# Patient Record
Sex: FEMALE | Race: BLACK OR AFRICAN AMERICAN | NOT HISPANIC OR LATINO | Employment: FULL TIME | URBAN - METROPOLITAN AREA
[De-identification: names, ages, dates, MRNs, and addresses within clinical notes are randomized per-mention and may not be internally consistent; named-entity substitution may affect disease eponyms.]

---

## 2018-05-24 LAB
CHOLEST SERPL-MSCNC: 244 MG/DL (ref 0–200)
HBA1C MFR BLD: 5.8 % (ref 4.2–5.7)
HDLC SERPL-MCNC: 72 MG/DL (ref 35–70)
LDL/HDL RATIO: 3 (ref 1–3)
LDLC SERPL CALC-MCNC: 159 MG/DL
TRIGL SERPL-MCNC: 69 MG/DL (ref 0–130)
VLDL CHOLESTEROL: 13 MG/DL (ref 0–40)

## 2019-09-17 ENCOUNTER — TELEPHONE (OUTPATIENT)
Dept: FAMILY MEDICINE | Facility: CLINIC | Age: 64
End: 2019-09-17

## 2019-09-17 DIAGNOSIS — Z12.39 BREAST CANCER SCREENING: Primary | ICD-10-CM

## 2019-09-17 NOTE — TELEPHONE ENCOUNTER
----- Message from Leah Irvin sent at 9/17/2019  2:57 PM CDT -----  PT NEED AN ORDER FOR HER MAMMO SENT TO  [facility].

## 2019-10-22 ENCOUNTER — TELEPHONE (OUTPATIENT)
Dept: UROLOGY | Facility: CLINIC | Age: 64
End: 2019-10-22

## 2019-10-22 NOTE — TELEPHONE ENCOUNTER
----- Message from Emmanuel Dominguez sent at 10/21/2019  1:11 PM CDT -----  Contact: self  Type:  RX Refill Request    Who Called: pt  Refill or New Rx:refill  RX Name and Strength:oxybutynin-5mg  How is the patient currently taking it? (ex. 1XDay):1xday  Is this a 30 day or 90 day RX:30  Preferred Pharmacy with phone number:   [facility]  [address], [place]  Local or Mail Order:local  Ordering Provider:marge  Would the patient rather a call back or a response via [facility]? Call back  Best Call Back Number:[phone]  Additional Information: none    Thanks,  Emmanuel Dominguez

## 2019-11-08 ENCOUNTER — TELEPHONE (OUTPATIENT)
Dept: UROLOGY | Facility: CLINIC | Age: 64
End: 2019-11-08

## 2019-11-08 NOTE — TELEPHONE ENCOUNTER
----- Message from Soha Guillory sent at 11/8/2019  8:45 AM CST -----  Contact: Patient   .Type:  Needs Medical Advice    Who Called: Patient   Symptoms (please be specific): hernia issues   How long has patient had these symptoms:    [facility]  Would the patient rather a call back or a response via [facility]? call  Best Call Back Number: [phone]  Additional Information: Patient thinks the medication she is on now is messing with her hernia and would like the  To change her medication .

## 2019-12-05 ENCOUNTER — OFFICE VISIT (OUTPATIENT)
Dept: FAMILY MEDICINE | Facility: CLINIC | Age: 64
End: 2019-12-05

## 2019-12-05 VITALS
TEMPERATURE: 97 F | BODY MASS INDEX: 27.82 KG/M2 | OXYGEN SATURATION: 100 % | HEIGHT: 65 IN | DIASTOLIC BLOOD PRESSURE: 73 MMHG | SYSTOLIC BLOOD PRESSURE: 134 MMHG | HEART RATE: 62 BPM | WEIGHT: 167 LBS

## 2019-12-05 DIAGNOSIS — I10 ESSENTIAL HYPERTENSION: ICD-10-CM

## 2019-12-05 DIAGNOSIS — F41.9 ANXIETY: ICD-10-CM

## 2019-12-05 PROCEDURE — 99396 PREV VISIT EST AGE 40-64: CPT | Mod: S$GLB,,, | Performed by: FAMILY MEDICINE

## 2019-12-05 PROCEDURE — 99396 PR PREVENTIVE VISIT,EST,40-64: ICD-10-PCS | Mod: S$GLB,,, | Performed by: FAMILY MEDICINE

## 2019-12-05 RX ORDER — DIAZEPAM 5 MG/1
TABLET ORAL
COMMUNITY
End: 2019-12-05 | Stop reason: SDUPTHER

## 2019-12-05 RX ORDER — CALC/MAG/B COMPLEX/D3/HERB 61
15 TABLET ORAL DAILY
COMMUNITY
End: 2023-10-26

## 2019-12-05 RX ORDER — HYDROCHLOROTHIAZIDE 12.5 MG/1
12.5 CAPSULE ORAL DAILY
Refills: 5 | COMMUNITY
Start: 2019-10-06 | End: 2020-01-13 | Stop reason: SDUPTHER

## 2019-12-05 RX ORDER — ALBUTEROL SULFATE 90 UG/1
AEROSOL, METERED RESPIRATORY (INHALATION)
COMMUNITY

## 2019-12-05 RX ORDER — OXYBUTYNIN CHLORIDE 5 MG/1
5 TABLET, EXTENDED RELEASE ORAL DAILY
Refills: 5 | COMMUNITY
Start: 2019-11-17 | End: 2020-04-09

## 2019-12-05 RX ORDER — ESCITALOPRAM OXALATE 10 MG/1
10 TABLET ORAL DAILY
Refills: 3 | COMMUNITY
Start: 2019-10-14 | End: 2020-01-13 | Stop reason: SDUPTHER

## 2019-12-05 RX ORDER — ASPIRIN 81 MG/1
81 TABLET ORAL DAILY
COMMUNITY

## 2019-12-05 RX ORDER — DIAZEPAM 5 MG/1
5 TABLET ORAL EVERY 6 HOURS PRN
Qty: 30 TABLET | Refills: 0 | Status: SHIPPED | OUTPATIENT
Start: 2019-12-05 | End: 2020-02-27 | Stop reason: SDUPTHER

## 2019-12-06 NOTE — PROGRESS NOTES
SUBJECTIVE:   64 y.o. female for annual routine checkup.  Patient reports that she has had her mammogram done.  She has weaned herself off of Lexapro but still takes the diazepam as needed for anxiety.  She would like to get a refill of the diazepam.  She is also still taking the hydrochlorothiazide for her blood pressure and Prevacid for acid reflux.  She was started on oxybutynin for urge incontinence but she states the medicine causes her hiatal hernia to flare-up so she stopped taking it.  She has not been following her diet as she was previously the she still has been walking.  She does not smoke or drink alcohol.  Current Outpatient Medications   Medication Sig Dispense Refill    albuterol (PROVENTIL/VENTOLIN HFA) 90 mcg/actuation inhaler ProAir HFA 90 mcg/actuation aerosol inhaler   TAKE 2 PUFFS BY MOUTH 4 TIMES A DAY AS NEEDED FOR SHORTNESS OF BREATH/WHEEZING/COUGH      aspirin (ECOTRIN) 81 MG EC tablet Take 81 mg by mouth once daily.      diazePAM (VALIUM) 5 MG tablet Take 1 tablet (5 mg total) by mouth every 6 (six) hours as needed for Anxiety. 30 tablet 0    escitalopram oxalate (LEXAPRO) 10 MG tablet Take 10 mg by mouth once daily.  3    hydroCHLOROthiazide (MICROZIDE) 12.5 mg capsule Take 12.5 mg by mouth once daily.  5    lansoprazole (PREVACID) 15 MG capsule Take 15 mg by mouth once daily.      multivitamin capsule Take 1 capsule by mouth once daily.      oxybutynin (DITROPAN-XL) 5 MG TR24 Take 5 mg by mouth once daily.  5     No current facility-administered medications for this visit.      Allergies: Latex   No LMP recorded. Patient has had a hysterectomy.    ROS:  Feeling well. No dyspnea or chest pain on exertion.  No abdominal pain, change in bowel habits, black or bloody stools.  No urinary tract symptoms. GYN ROS: no breast pain or new or enlarging lumps on self exam. No neurological complaints; anxiety has been being controlled with intermittent diazepam.    OBJECTIVE:   The patient  "appears well, alert, oriented x 3, in no distress.  /73 (BP Location: Left arm, Patient Position: Sitting, BP Method: Medium (Automatic))   Pulse 62   Temp 97.4 °F (36.3 °C)   Ht 5' 5" (1.651 m)   Wt 75.8 kg (167 lb)   SpO2 100%   BMI 27.79 kg/m²   ENT normal.  Neck supple. No adenopathy or thyromegaly. VELMA. Lungs are clear, good air entry, no wheezes, rhonchi or rales. S1 and S2 normal, no murmurs, regular rate and rhythm. Abdomen soft without tenderness, guarding, mass or organomegaly. Extremities show no edema, normal peripheral pulses. Neurological is normal, no focal findings.      ASSESSMENT:   Adult health examination  Essential hypertension  Gastroesophageal reflux disease  Generalized anxiety disorder    PLAN:   Hypertension is chronic and has been controlled on hydrochlorothiazide 12.5 mg.  GERD is chronic and has been controlled on Prevacid 50 mg daily.  Anxiety is much better controlled and she has weaned off of Lexapro and takes diazepam only as needed and is currently in need of a refill.  She is encouraged to continue her healthy eating and exercising regularly.  "

## 2020-01-13 DIAGNOSIS — F41.9 ANXIETY: Primary | ICD-10-CM

## 2020-01-13 DIAGNOSIS — I10 ESSENTIAL HYPERTENSION: ICD-10-CM

## 2020-01-13 RX ORDER — ESCITALOPRAM OXALATE 10 MG/1
10 TABLET ORAL DAILY
Qty: 90 TABLET | Refills: 3 | Status: SHIPPED | OUTPATIENT
Start: 2020-01-13

## 2020-01-13 RX ORDER — HYDROCHLOROTHIAZIDE 12.5 MG/1
12.5 CAPSULE ORAL DAILY
Qty: 90 CAPSULE | Refills: 3 | Status: SHIPPED | OUTPATIENT
Start: 2020-01-13 | End: 2023-10-26

## 2020-01-15 ENCOUNTER — CLINICAL SUPPORT (OUTPATIENT)
Dept: FAMILY MEDICINE | Facility: CLINIC | Age: 65
End: 2020-01-15

## 2020-01-15 PROCEDURE — 90471 IMMUNIZATION ADMIN: CPT | Mod: S$GLB,,, | Performed by: FAMILY MEDICINE

## 2020-01-15 PROCEDURE — 90686 FLU VACCINE (QUAD) GREATER THAN OR EQUAL TO 3YO PRESERVATIVE FREE IM: ICD-10-PCS | Mod: S$GLB,,, | Performed by: FAMILY MEDICINE

## 2020-01-15 PROCEDURE — 90686 IIV4 VACC NO PRSV 0.5 ML IM: CPT | Mod: S$GLB,,, | Performed by: FAMILY MEDICINE

## 2020-01-15 PROCEDURE — 90471 FLU VACCINE (QUAD) GREATER THAN OR EQUAL TO 3YO PRESERVATIVE FREE IM: ICD-10-PCS | Mod: S$GLB,,, | Performed by: FAMILY MEDICINE

## 2020-02-27 DIAGNOSIS — F41.9 ANXIETY: ICD-10-CM

## 2020-02-27 RX ORDER — DIAZEPAM 5 MG/1
5 TABLET ORAL EVERY 6 HOURS PRN
Qty: 30 TABLET | Refills: 2 | Status: SHIPPED | OUTPATIENT
Start: 2020-02-27 | End: 2021-10-29

## 2020-04-09 RX ORDER — OXYBUTYNIN CHLORIDE 5 MG/1
TABLET, EXTENDED RELEASE ORAL
Qty: 30 TABLET | Refills: 5 | Status: SHIPPED | OUTPATIENT
Start: 2020-04-09 | End: 2020-10-13 | Stop reason: SDUPTHER

## 2020-04-17 ENCOUNTER — PATIENT OUTREACH (OUTPATIENT)
Dept: ADMINISTRATIVE | Facility: HOSPITAL | Age: 65
End: 2020-04-17

## 2020-04-17 NOTE — PROGRESS NOTES
Chart review, no results found in [facility] or [facility]. Mammogram, lipid panel and immunizations scanned into chart from [employer] documents and sent to LPN-[facility].

## 2020-10-13 ENCOUNTER — TELEPHONE (OUTPATIENT)
Dept: UROLOGY | Facility: CLINIC | Age: 65
End: 2020-10-13

## 2020-10-13 DIAGNOSIS — R39.15 URINARY URGENCY: Primary | ICD-10-CM

## 2020-10-13 RX ORDER — OXYBUTYNIN CHLORIDE 5 MG/1
5 TABLET, EXTENDED RELEASE ORAL DAILY
Qty: 30 TABLET | Refills: 0 | Status: SHIPPED | OUTPATIENT
Start: 2020-10-13 | End: 2020-10-19 | Stop reason: SDUPTHER

## 2020-10-13 NOTE — TELEPHONE ENCOUNTER
----- Message from Rhea Quiroga sent at 10/12/2020 10:58 AM CDT -----  Regarding: pt  Pt called in regards to her medication refill. Please call back at [phone]  oxybutynin (DITROPAN-XL) 5 MG Tr24    [place], [place]

## 2020-10-13 NOTE — TELEPHONE ENCOUNTER
Spoke with patient, verified upcoming appt, refill sent for enough medication to last until her appt

## 2020-10-19 ENCOUNTER — OFFICE VISIT (OUTPATIENT)
Dept: UROLOGY | Facility: CLINIC | Age: 65
End: 2020-10-19
Payer: MEDICARE

## 2020-10-19 VITALS
BODY MASS INDEX: 27.82 KG/M2 | HEIGHT: 65 IN | DIASTOLIC BLOOD PRESSURE: 65 MMHG | HEART RATE: 80 BPM | RESPIRATION RATE: 18 BRPM | WEIGHT: 167 LBS | SYSTOLIC BLOOD PRESSURE: 145 MMHG

## 2020-10-19 DIAGNOSIS — R31.29 MICROSCOPIC HEMATURIA: ICD-10-CM

## 2020-10-19 DIAGNOSIS — R39.15 URINARY URGENCY: Primary | ICD-10-CM

## 2020-10-19 PROBLEM — J32.9 SINUSITIS: Status: ACTIVE | Noted: 2020-10-19

## 2020-10-19 PROBLEM — F43.10 POSTTRAUMATIC STRESS DISORDER: Status: ACTIVE | Noted: 2020-10-19

## 2020-10-19 PROBLEM — N95.1 MENOPAUSAL FLUSHING: Status: ACTIVE | Noted: 2020-10-19

## 2020-10-19 PROBLEM — R60.9 EDEMA: Status: ACTIVE | Noted: 2020-10-19

## 2020-10-19 PROBLEM — K21.9 GASTROESOPHAGEAL REFLUX DISEASE: Status: ACTIVE | Noted: 2020-10-19

## 2020-10-19 PROBLEM — L65.9 ALOPECIA: Status: ACTIVE | Noted: 2020-10-19

## 2020-10-19 PROBLEM — E55.9 VITAMIN D DEFICIENCY: Status: ACTIVE | Noted: 2020-10-19

## 2020-10-19 PROBLEM — J30.9 ALLERGIC RHINITIS: Status: ACTIVE | Noted: 2020-10-19

## 2020-10-19 PROBLEM — R39.9 SYMPTOMS INVOLVING URINARY SYSTEM: Status: ACTIVE | Noted: 2020-10-19

## 2020-10-19 PROBLEM — R42 VERTIGO: Status: ACTIVE | Noted: 2020-10-19

## 2020-10-19 PROBLEM — R03.0 ELEVATED BLOOD-PRESSURE READING WITHOUT DIAGNOSIS OF HYPERTENSION: Status: ACTIVE | Noted: 2020-10-19

## 2020-10-19 PROCEDURE — 99213 PR OFFICE/OUTPT VISIT, EST, LEVL III, 20-29 MIN: ICD-10-PCS | Mod: 25,S$GLB,, | Performed by: NURSE PRACTITIONER

## 2020-10-19 PROCEDURE — 99213 OFFICE O/P EST LOW 20 MIN: CPT | Mod: 25,S$GLB,, | Performed by: NURSE PRACTITIONER

## 2020-10-19 PROCEDURE — 81001 URINALYSIS AUTO W/SCOPE: CPT | Mod: S$GLB,,, | Performed by: NURSE PRACTITIONER

## 2020-10-19 PROCEDURE — 81001 PR  URINALYSIS, AUTO, W/SCOPE: ICD-10-PCS | Mod: S$GLB,,, | Performed by: NURSE PRACTITIONER

## 2020-10-19 RX ORDER — OXYBUTYNIN CHLORIDE 5 MG/1
5 TABLET, EXTENDED RELEASE ORAL DAILY
Qty: 30 TABLET | Refills: 11 | Status: SHIPPED | OUTPATIENT
Start: 2020-10-19 | End: 2021-10-29 | Stop reason: SDUPTHER

## 2020-10-19 RX ORDER — ATORVASTATIN CALCIUM 20 MG/1
20 TABLET, FILM COATED ORAL NIGHTLY
COMMUNITY
Start: 2020-10-12

## 2020-10-19 NOTE — PROGRESS NOTES
Chief Complaint:   Chief Complaint   Patient presents with    Yrly    H/O Micro Hematuria    Medication Refill     refill Oxybutynin        HPI:  65-year-old female known to the service of Dr. España who presents for annual follow-up microscopic hematuria.  She had a full negative workup for hematuria in August 2016, we continue to monitor her urine annually.  She denies any episodes of gross hematuria since our last visit.    She also experiences urinary urgency without any urge incontinence.  She no longer drinks sodas or coffee.  She denies any constipation.  She is maintained on oxybutynin XL 5 mg p.o. daily and reports that her symptoms are controlled.  She is requesting a refill on this medication today.    She denies any new urological complaints such as urinary frequency, burning with urination, pelvic pain, or flank pain.    Of note, she reports that she has a known hiatal hernia and has been experiencing exacerbation of symptoms related to it.  She was not sure if the oxybutynin is causing some of her symptoms.  We have given her a list of general surgeons to research about and call for referral for further evaluation.    Allergies:  Latex    Medications: has a current medication list which includes the following prescription(s): albuterol, aspirin, atorvastatin, diazepam, escitalopram oxalate, hydrochlorothiazide, lansoprazole, multivitamin, and oxybutynin.    Review of Systems:  General: No fever, chills, vision changes, dizziness, weakness, fatigue, unexplained weight loss, confusion, or mood swings.  Skin: No rashes, itching, or changes in color/texture of skin.  Chest: Denies [place], cyanosis, wheezing, cough, and sputum production.  Abdomen: Appetite fine. Denies diarrhea, abdominal pain, hematemesis, or blood in stool.  Musculoskeletal: No joint stiffness or swelling. No painful lymph nodes  : As above.  All other review of systems negative.    PMH:   has a past medical history of Acid reflux,  Anxiety, Hiatal hernia, Hypertension, Microscopic hematuria, and Urinary disorder.    PSH:   has a past surgical history that includes Myomectomy; Hysterectomy; Appendectomy; Oophorectomy; and Cataract extraction.    FamHx: family history includes Cancer in her mother; Diabetes in her mother and sister; Heart disease in her father.    SocHx:  reports that she quit smoking about 6 years ago. Her smoking use included cigarettes. She has never used smokeless tobacco. She reports current alcohol use. She reports that she does not use drugs.      Physical Exam:  Vitals:    10/19/20 1003   BP: (!) 145/65   Pulse: 80   Resp: 18     General: AAOx3, no apparent distress, no deformities  Neck: supple, no masses, normal thyroid, full ROM  Lungs: CTAB, no adventitious breath sounds, normal inspiration, no use of accessory muscles  Heart: regular rate and rhythm, no arrhythmias  Abdomen: soft, NT, ND, no masses, no hernias, no hepatosplenomegaly  Lymphatic: no unusually enlarged or tender lymph nodes  Skin: warm and dry, no jaundice, no rash  Ext: without edema or deformity, CAPUTO, ambulates independently  : deferred    Labs/Studies: UA voided sample shows SG 1.015, pH 6.5, WBCs 0-5/hpf, RBCs 0-3/hpf, epi 4+, bact 1+    Impression/Plan:   Urinary urgency  Comments:  continue oxybutynin XL 5mg daily- refill sent, UA negative for infection, f/u 1 year or if symptoms worsen  Orders:  -     oxybutynin (DITROPAN-XL) 5 MG TR24; Take 1 tablet (5 mg total) by mouth once daily.  Dispense: 30 tablet; Refill: 11    Microscopic hematuria  Comments:  prior negative workup in 8/2016, UA negative for hematuria today, continue to monitor  Orders:  -     POCT Urinalysis (w/Micro Option)        Follow up in about 1 year (around 10/19/2021), or if symptoms worsen or fail to improve.

## 2020-10-19 NOTE — PROGRESS NOTES
Patient seen and examined.  Clinical data reviewed.  Case discussed with the nurse practitioner.  I agree with her assessment and plan.    Briefly 65-year-old female with a history of microscopic hematuria.  Underwent a full evaluation 2016 no abnormalities both on CT urogram and cystoscopy.  We monitoring her on an annual basis.  She also has urinary urgency is with urge incontinence.  That is controlled well with Ditropan 5 mg extended release once a day.  She has a hiatal hernia and him becomes really symptomatic so taking medications is sometimes challenging to her.  Today we reviewed a urinalysis which was negative for infection without any microscopic hematuria either.  Plan will be to have her return to clinic in 1 year.  We suggested a general surgical referral for management of hiatal hernia.  She will also continue taking the Ditropan every other day.

## 2020-10-30 ENCOUNTER — PATIENT MESSAGE (OUTPATIENT)
Dept: ADMINISTRATIVE | Facility: HOSPITAL | Age: 65
End: 2020-10-30

## 2021-10-29 ENCOUNTER — OFFICE VISIT (OUTPATIENT)
Dept: UROLOGY | Facility: CLINIC | Age: 66
End: 2021-10-29
Payer: MEDICARE

## 2021-10-29 VITALS — RESPIRATION RATE: 18 BRPM | BODY MASS INDEX: 23.82 KG/M2 | WEIGHT: 143 LBS | HEIGHT: 65 IN

## 2021-10-29 DIAGNOSIS — R39.15 URINARY URGENCY: ICD-10-CM

## 2021-10-29 DIAGNOSIS — N39.41 URGE INCONTINENCE: ICD-10-CM

## 2021-10-29 DIAGNOSIS — R31.29 MICROSCOPIC HEMATURIA: Primary | ICD-10-CM

## 2021-10-29 PROCEDURE — 81001 URINALYSIS AUTO W/SCOPE: CPT | Mod: S$GLB,,, | Performed by: UROLOGY

## 2021-10-29 PROCEDURE — 81001 PR  URINALYSIS, AUTO, W/SCOPE: ICD-10-PCS | Mod: S$GLB,,, | Performed by: UROLOGY

## 2021-10-29 PROCEDURE — 99213 PR OFFICE/OUTPT VISIT, EST, LEVL III, 20-29 MIN: ICD-10-PCS | Mod: S$GLB,,, | Performed by: UROLOGY

## 2021-10-29 PROCEDURE — 99213 OFFICE O/P EST LOW 20 MIN: CPT | Mod: S$GLB,,, | Performed by: UROLOGY

## 2021-10-29 RX ORDER — OXYBUTYNIN CHLORIDE 5 MG/1
5 TABLET, EXTENDED RELEASE ORAL DAILY
Qty: 30 TABLET | Refills: 11 | Status: SHIPPED | OUTPATIENT
Start: 2021-10-29 | End: 2022-01-18 | Stop reason: SDUPTHER

## 2021-10-29 RX ORDER — PANTOPRAZOLE SODIUM 40 MG/1
TABLET, DELAYED RELEASE ORAL
COMMUNITY
Start: 2021-09-02

## 2022-01-18 ENCOUNTER — TELEPHONE (OUTPATIENT)
Dept: UROLOGY | Facility: CLINIC | Age: 67
End: 2022-01-18
Payer: MEDICARE

## 2022-01-18 DIAGNOSIS — N39.41 URGE INCONTINENCE: ICD-10-CM

## 2022-01-18 DIAGNOSIS — R39.15 URINARY URGENCY: ICD-10-CM

## 2022-01-18 RX ORDER — OXYBUTYNIN CHLORIDE 5 MG/1
5 TABLET, EXTENDED RELEASE ORAL DAILY
Qty: 90 TABLET | Refills: 3 | Status: SHIPPED | OUTPATIENT
Start: 2022-01-18 | End: 2022-10-25 | Stop reason: SDUPTHER

## 2022-01-18 NOTE — TELEPHONE ENCOUNTER
----- Message from Robyn Edwards MA sent at 1/18/2022  8:41 AM CST -----  Contact: PT    ----- Message -----  From: Jamaica Carrillo  Sent: 1/18/2022   8:31 AM CST  To: Bhupendra Miguel Staff        Name of Caller: Arianna  Pharmacy Name: ..      [facility] - [address], [address], [address] [address]  Phone: [phone] Fax: [phone]        Prescription Name: oxybutynin (DITROPAN-XL) 5 MG TR24     What do they need to clarify?: need 90 day supply/ pt being charged 10 w/o change   Best Call Back Number: [phone] (home) or  [phone]     Additional Information:

## 2022-10-25 ENCOUNTER — OFFICE VISIT (OUTPATIENT)
Dept: UROLOGY | Facility: CLINIC | Age: 67
End: 2022-10-25
Payer: MEDICARE

## 2022-10-25 VITALS
HEIGHT: 65 IN | HEART RATE: 70 BPM | BODY MASS INDEX: 23.82 KG/M2 | WEIGHT: 143 LBS | SYSTOLIC BLOOD PRESSURE: 147 MMHG | DIASTOLIC BLOOD PRESSURE: 67 MMHG

## 2022-10-25 DIAGNOSIS — R39.15 URINARY URGENCY: ICD-10-CM

## 2022-10-25 DIAGNOSIS — N39.41 URGE INCONTINENCE: ICD-10-CM

## 2022-10-25 PROCEDURE — 99214 PR OFFICE/OUTPT VISIT, EST, LEVL IV, 30-39 MIN: ICD-10-PCS | Mod: S$GLB,,, | Performed by: UROLOGY

## 2022-10-25 PROCEDURE — 99214 OFFICE O/P EST MOD 30 MIN: CPT | Mod: S$GLB,,, | Performed by: UROLOGY

## 2022-10-25 RX ORDER — OXYBUTYNIN CHLORIDE 5 MG/1
10 TABLET, EXTENDED RELEASE ORAL DAILY
Qty: 180 TABLET | Refills: 3 | Status: SHIPPED | OUTPATIENT
Start: 2022-10-25 | End: 2022-12-19

## 2022-10-25 NOTE — PROGRESS NOTES
Subjective:       Patient ID: Arianna Hanson is a 67 y.o. female.    Chief Complaint: Hematuria and Urinary Incontinence      HPI:  67-year-old female in for yearly follow-up of her urinary urgency and urge incontinence she also has a history of microscopic hematuria has been worked up in the past over the past few years however she has had negative hematuria    Past Medical History:   Past Medical History:   Diagnosis Date    Acid reflux     Anxiety     Hiatal hernia     Hypertension     Microscopic hematuria     Urinary disorder        Past Surgical Historical:   Past Surgical History:   Procedure Laterality Date    APPENDECTOMY      CATARACT EXTRACTION      both eyes    HYSTERECTOMY      MYOMECTOMY      OOPHORECTOMY          Medications:   Medication List with Changes/Refills   Current Medications    ALBUTEROL (PROVENTIL/VENTOLIN HFA) 90 MCG/ACTUATION INHALER    ProAir HFA 90 mcg/actuation aerosol inhaler   TAKE 2 PUFFS BY MOUTH 4 TIMES A DAY AS NEEDED FOR SHORTNESS OF BREATH/WHEEZING/COUGH    ASPIRIN (ECOTRIN) 81 MG EC TABLET    Take 81 mg by mouth once daily.    ATORVASTATIN (LIPITOR) 20 MG TABLET    Take 20 mg by mouth every evening.    ESCITALOPRAM OXALATE (LEXAPRO) 10 MG TABLET    Take 1 tablet (10 mg total) by mouth once daily.    HYDROCHLOROTHIAZIDE (MICROZIDE) 12.5 MG CAPSULE    Take 1 capsule (12.5 mg total) by mouth once daily.    LANSOPRAZOLE (PREVACID) 15 MG CAPSULE    Take 15 mg by mouth once daily.    MULTIVITAMIN CAPSULE    Take 1 capsule by mouth once daily.    OXYBUTYNIN (DITROPAN-XL) 5 MG TR24    Take 1 tablet (5 mg total) by mouth once daily.    PANTOPRAZOLE (PROTONIX) 40 MG TABLET            Past Social History:   Social History     Socioeconomic History    Marital status:    Tobacco Use    Smoking status: Former     Types: Cigarettes     Quit date: 2014     Years since quittin.7    Smokeless tobacco: Never   Substance and Sexual Activity    Alcohol use: Yes    Drug use: Never        Allergies:   Review of patient's allergies indicates:   Allergen Reactions    Latex         Family History:   Family History   Problem Relation Age of Onset    Diabetes Mother     Cancer Mother     Heart disease Father     Diabetes Sister         Review of Systems:  Review of Systems    Physical Exam:  Physical Exam    Assessment/Plan:       Problem List Items Addressed This Visit    None  Visit Diagnoses       Urinary urgency        Urge incontinence                   Urinary urgency and urge incontinence:   We will refill oxybutynin 5 mg as needed     Microscopic hematuria.  Patient's urine is negative today for hematuria return to clinic in 1 year

## 2022-11-04 ENCOUNTER — TELEPHONE (OUTPATIENT)
Dept: UROLOGY | Facility: CLINIC | Age: 67
End: 2022-11-04
Payer: MEDICARE

## 2022-11-04 NOTE — TELEPHONE ENCOUNTER
Contacted, advised pt that EHR sent rx on day of visit, based off script he increased her dosage. She is to take 2/5mg once a day which equals the 10mg. Pt verbalized understanding. BJP    ----- Message from Talya Gallo sent at 11/4/2022  9:36 AM CDT -----  Contact: pt  Pt calling about medication oxybutynin (DITROPAN-XL) 10 MG.  Pt stated 10 is working better than 5 and pt said doctor told her call to let him know so he can frwd refill to pharmacy.  Pt can be reached at [phone].  Pt stated they do a ninety day supply.     [facility] - [place] [id number] [facility]  [address] [address]  Phone: [phone] Fax: [phone]    Thanks,

## 2022-12-06 ENCOUNTER — TELEPHONE (OUTPATIENT)
Dept: UROLOGY | Facility: CLINIC | Age: 67
End: 2022-12-06
Payer: MEDICARE

## 2022-12-06 NOTE — TELEPHONE ENCOUNTER
Contacted pt, advised that she can dc taking the 2tabs if its making her ill, advised that I will have it documented into her chart. Pt verbalized understanding. LUDIVINA

## 2022-12-19 ENCOUNTER — TELEPHONE (OUTPATIENT)
Dept: UROLOGY | Facility: CLINIC | Age: 67
End: 2022-12-19
Payer: MEDICARE

## 2022-12-19 DIAGNOSIS — R39.15 URINARY URGENCY: Primary | ICD-10-CM

## 2022-12-19 RX ORDER — OXYBUTYNIN CHLORIDE 5 MG/1
5 TABLET, EXTENDED RELEASE ORAL DAILY
Qty: 90 TABLET | Refills: 3 | Status: SHIPPED | OUTPATIENT
Start: 2022-12-19 | End: 2023-10-20

## 2022-12-19 NOTE — TELEPHONE ENCOUNTER
Spoke with pt. She said that taking 2 tabs daily upset her stomach so she is going back to one tab daily of the oxybutynin  Her pharmacy cancelled the  order for the 2 tabs and she needs another script sent to [facility].  Put script in and sent for electronic signature.----- Message from iNka Alexander sent at 12/19/2022  3:50 PM CST -----  Contact: self  Type:  Patient Returning Call    Who Called:Arianna Hanson   Who Left Message for Patient:not sure   Does the patient know what this is regarding?:new RX   Would the patient rather a call back or a response via [facility]? Call   Best Call Back Number:[phone]   Additional Information: na

## 2022-12-19 NOTE — TELEPHONE ENCOUNTER
Attempted to call patient back.  Left message to return call at her convenience.----- Message from Gary Perera sent at 12/19/2022  9:04 AM CST -----  Contact: self  Type:  RX Refill Request    Who Called: Patient  Refill or New Rx:refill  RX Name and Strength:oxybutynin (DITROPAN-XL) 5 MG TR24  How is the patient currently taking it? (ex. 1XDay):1x  Is this a 30 day or 90 day RX:90  Preferred Pharmacy with phone number:[facility]  Local or Mail Order:mail  Ordering Provider:Lamont Huziar  Would the patient rather a call back or a response via [facility]? N/A  Best Call Back Number:N/A  Additional Information: N/A

## 2023-10-19 DIAGNOSIS — R39.15 URINARY URGENCY: ICD-10-CM

## 2023-10-20 RX ORDER — OXYBUTYNIN CHLORIDE 5 MG/1
5 TABLET, EXTENDED RELEASE ORAL
Qty: 90 TABLET | Refills: 3 | Status: SHIPPED | OUTPATIENT
Start: 2023-10-20

## 2023-10-26 ENCOUNTER — OFFICE VISIT (OUTPATIENT)
Dept: UROLOGY | Facility: CLINIC | Age: 68
End: 2023-10-26
Payer: MEDICARE

## 2023-10-26 VITALS — HEIGHT: 65 IN | BODY MASS INDEX: 23.82 KG/M2 | WEIGHT: 143 LBS

## 2023-10-26 DIAGNOSIS — R39.15 URINARY URGENCY: Primary | ICD-10-CM

## 2023-10-26 DIAGNOSIS — N39.41 URGE URINARY INCONTINENCE: ICD-10-CM

## 2023-10-26 PROCEDURE — 99214 OFFICE O/P EST MOD 30 MIN: CPT | Mod: S$GLB,,, | Performed by: UROLOGY

## 2023-10-26 PROCEDURE — 99214 PR OFFICE/OUTPT VISIT, EST, LEVL IV, 30-39 MIN: ICD-10-PCS | Mod: S$GLB,,, | Performed by: UROLOGY

## 2023-10-26 RX ORDER — HYDROCHLOROTHIAZIDE 12.5 MG/1
TABLET ORAL
COMMUNITY
Start: 2023-09-08

## 2023-10-26 NOTE — PROGRESS NOTES
Subjective:       Patient ID: Arianna Hanson is a 68 y.o. female.    Chief Complaint: Urinary Urgency      HPI:   68-year-old female with urinary urge incontinence she is been on Ditropan for quite some time this appears to be controlling her symptoms she is doing well has no complaints.    Past Medical History:   Past Medical History:   Diagnosis Date    Acid reflux     Anxiety     Hiatal hernia     Hypertension     Microscopic hematuria     Urinary disorder        Past Surgical Historical:   Past Surgical History:   Procedure Laterality Date    APPENDECTOMY      CATARACT EXTRACTION      both eyes    HYSTERECTOMY      MYOMECTOMY      OOPHORECTOMY          Medications:   Medication List with Changes/Refills   Current Medications    ALBUTEROL (PROVENTIL/VENTOLIN HFA) 90 MCG/ACTUATION INHALER    ProAir HFA 90 mcg/actuation aerosol inhaler   TAKE 2 PUFFS BY MOUTH 4 TIMES A DAY AS NEEDED FOR SHORTNESS OF BREATH/WHEEZING/COUGH    ASPIRIN (ECOTRIN) 81 MG EC TABLET    Take 81 mg by mouth once daily.    ATORVASTATIN (LIPITOR) 20 MG TABLET    Take 20 mg by mouth every evening.    ESCITALOPRAM OXALATE (LEXAPRO) 10 MG TABLET    Take 1 tablet (10 mg total) by mouth once daily.    HYDROCHLOROTHIAZIDE (HYDRODIURIL) 12.5 MG TAB        MULTIVITAMIN CAPSULE    Take 1 capsule by mouth once daily.    OXYBUTYNIN (DITROPAN-XL) 5 MG TR24    TAKE 1 TABLET BY MOUTH ONCE  DAILY    PANTOPRAZOLE (PROTONIX) 40 MG TABLET       Discontinued Medications    HYDROCHLOROTHIAZIDE (MICROZIDE) 12.5 MG CAPSULE    Take 1 capsule (12.5 mg total) by mouth once daily.    LANSOPRAZOLE (PREVACID) 15 MG CAPSULE    Take 15 mg by mouth once daily.        Past Social History:   Social History     Socioeconomic History    Marital status:    Tobacco Use    Smoking status: Former     Current packs/day: 0.00     Types: Cigarettes     Quit date: 2014     Years since quittin.7    Smokeless tobacco: Never   Substance and Sexual Activity    Alcohol use:  Yes    Drug use: Never     Social Determinants of Health     Financial Resource Strain: Unknown (12/5/2019)    Overall Financial Resource Strain (CARDIA)     Difficulty of Paying Living Expenses: Patient refused   Food Insecurity: Unknown (12/5/2019)    Hunger Vital Sign     Worried About Running Out of Food in the Last Year: Patient refused     Ran Out of Food in the Last Year: Patient refused   Transportation Needs: Unknown (12/5/2019)    PRAPARE - Transportation     Lack of Transportation (Medical): Patient refused     Lack of Transportation (Non-Medical): Patient refused   Physical Activity: Unknown (12/5/2019)    Exercise Vital Sign     Days of Exercise per Week: Patient refused     Minutes of Exercise per Session: Patient refused   Stress: Unknown (12/5/2019)    [employer] - Occupational Stress Questionnaire     Feeling of Stress : Patient refused   Social Connections: Unknown (12/5/2019)    Social Connection and Isolation Panel [NHANES]     Frequency of Communication with Friends and Family: Patient refused     Frequency of Social Gatherings with Friends and Family: Patient refused     Attends Yarsanism Services: Patient refused     Active Member of Clubs or Organizations: Patient refused     Attends Club or Organization Meetings: Patient refused     Marital Status: Patient refused       Allergies:   Review of patient's allergies indicates:   Allergen Reactions    Latex     Msg [glutamic acid]         Family History:   Family History   Problem Relation Age of Onset    Diabetes Mother     Cancer Mother     Heart disease Father     Diabetes Sister         Review of Systems:  Review of Systems   Constitutional:  Negative for activity change and appetite change.   HENT:  Negative for congestion and dental problem.    Eyes:  Negative for pain and discharge.   Respiratory:  Negative for chest tightness and shortness of breath.    Cardiovascular:  Negative for chest pain.    Gastrointestinal:  Negative for abdominal distention and abdominal pain.   Endocrine: Negative for cold intolerance, heat intolerance and polyuria.   Genitourinary:  Negative for decreased urine volume, difficulty urinating, dyspareunia, dysuria, enuresis, flank pain, frequency, genital sores, hematuria, menstrual problem, pelvic pain, urgency, vaginal bleeding, vaginal discharge and vaginal pain.   Musculoskeletal:  Negative for back pain and neck pain.   Skin:  Negative for color change and rash.   Allergic/Immunologic: Negative for immunocompromised state.   Neurological:  Negative for dizziness.   Hematological:  Negative for adenopathy.   Psychiatric/Behavioral:  Negative for agitation, behavioral problems and confusion.        Physical Exam:  Physical Exam  Constitutional:       Appearance: She is well-developed.   HENT:      Head: Normocephalic and atraumatic.      Right Ear: External ear normal.      Left Ear: External ear normal.   Eyes:      Conjunctiva/sclera: Conjunctivae normal.   Neck:      Vascular: No JVD.   Cardiovascular:      Rate and Rhythm: Normal rate and regular rhythm.   Pulmonary:      Effort: Pulmonary effort is normal. No respiratory distress.      Breath sounds: Normal breath sounds. No wheezing.   Abdominal:      General: There is no distension.      Palpations: Abdomen is soft.      Tenderness: There is no abdominal tenderness. There is no rebound.   Musculoskeletal:         General: Normal range of motion.      Cervical back: Normal range of motion.   Skin:     General: Skin is warm and dry.      Findings: No erythema or rash.   Neurological:      Mental Status: She is alert and oriented to person, place, and time.   Psychiatric:         Behavior: Behavior normal.         Assessment/Plan:       Problem List Items Addressed This Visit    None          68-year-old female with urge urinary incontinence we will refill her Ditropan return to clinic in 1 year or sooner with any issues

## 2024-10-10 DIAGNOSIS — R39.15 URINARY URGENCY: ICD-10-CM

## 2024-10-14 RX ORDER — OXYBUTYNIN CHLORIDE 5 MG/1
5 TABLET, EXTENDED RELEASE ORAL
Qty: 90 TABLET | Refills: 3 | Status: SHIPPED | OUTPATIENT
Start: 2024-10-14

## 2024-10-15 ENCOUNTER — OFFICE VISIT (OUTPATIENT)
Dept: UROLOGY | Facility: CLINIC | Age: 69
End: 2024-10-15
Payer: MEDICARE

## 2024-10-15 VITALS
BODY MASS INDEX: 24.16 KG/M2 | WEIGHT: 145 LBS | SYSTOLIC BLOOD PRESSURE: 138 MMHG | OXYGEN SATURATION: 99 % | DIASTOLIC BLOOD PRESSURE: 78 MMHG | HEART RATE: 66 BPM | HEIGHT: 65 IN

## 2024-10-15 DIAGNOSIS — N39.41 URGE URINARY INCONTINENCE: Primary | ICD-10-CM

## 2024-10-15 PROCEDURE — 99214 OFFICE O/P EST MOD 30 MIN: CPT | Mod: S$GLB,,, | Performed by: UROLOGY

## 2024-10-15 NOTE — PROGRESS NOTES
Subjective:       Patient ID: Arianna Hanson is a 69 y.o. female.    Chief Complaint: No chief complaint on file.      HPI:   69-year-old female with urinary urge incontinence she has been on Ditropan for quite some time which was helping control her symptoms. However, she feels that her symptoms are worsening.     Past Medical History:   Past Medical History:   Diagnosis Date    Acid reflux     Anxiety     Hiatal hernia     Hypertension     Microscopic hematuria     Urinary disorder        Past Surgical Historical:   Past Surgical History:   Procedure Laterality Date    APPENDECTOMY      CATARACT EXTRACTION      both eyes    HYSTERECTOMY      MYOMECTOMY      OOPHORECTOMY          Medications:   Medication List with Changes/Refills   Current Medications    ALBUTEROL (PROVENTIL/VENTOLIN HFA) 90 MCG/ACTUATION INHALER    ProAir HFA 90 mcg/actuation aerosol inhaler   TAKE 2 PUFFS BY MOUTH 4 TIMES A DAY AS NEEDED FOR SHORTNESS OF BREATH/WHEEZING/COUGH    ASPIRIN (ECOTRIN) 81 MG EC TABLET    Take 81 mg by mouth once daily.    ATORVASTATIN (LIPITOR) 20 MG TABLET    Take 20 mg by mouth every evening.    ERGOCALCIFEROL, VITAMIN D2, (VITAMIN D ORAL)    Take by mouth. With calcium    ESCITALOPRAM OXALATE (LEXAPRO) 10 MG TABLET    Take 1 tablet (10 mg total) by mouth once daily.    HYDROCHLOROTHIAZIDE (HYDRODIURIL) 12.5 MG TAB        MULTIVITAMIN CAPSULE    Take 1 capsule by mouth once daily.    OXYBUTYNIN (DITROPAN-XL) 5 MG TR24    TAKE 1 TABLET BY MOUTH ONCE  DAILY    PANTOPRAZOLE (PROTONIX) 40 MG TABLET            Past Social History:   Social History     Socioeconomic History    Marital status:    Tobacco Use    Smoking status: Former     Current packs/day: 0.00     Types: Cigarettes     Quit date: 2/1/2014     Years since quitting: 10.7    Smokeless tobacco: Never   Substance and Sexual Activity    Alcohol use: Yes    Drug use: Never     Social Drivers of Health     Financial Resource Strain: Unknown (12/5/2019)     Overall Financial Resource Strain (CARDIA)     Difficulty of Paying Living Expenses: Patient declined   Food Insecurity: Unknown (12/5/2019)    Hunger Vital Sign     Worried About Running Out of Food in the Last Year: Patient declined     Ran Out of Food in the Last Year: Patient declined   Transportation Needs: Unknown (12/5/2019)    PRAPARE - Transportation     Lack of Transportation (Medical): Patient declined     Lack of Transportation (Non-Medical): Patient declined   Physical Activity: Unknown (12/5/2019)    Exercise Vital Sign     Days of Exercise per Week: Patient declined     Minutes of Exercise per Session: Patient declined   Stress: Unknown (12/5/2019)    [employer] - Occupational Stress Questionnaire     Feeling of Stress : Patient declined       Allergies:   Review of patient's allergies indicates:   Allergen Reactions    Latex     Msg [glutamic acid]         Family History:   Family History   Problem Relation Name Age of Onset    Diabetes Mother      Cancer Mother      Heart disease Father      Diabetes Sister          Review of Systems:  Review of Systems   Constitutional:  Negative for activity change and appetite change.   HENT:  Negative for congestion and dental problem.    Eyes:  Negative for pain and discharge.   Respiratory:  Negative for chest tightness and shortness of breath.    Cardiovascular:  Negative for chest pain.   Gastrointestinal:  Negative for abdominal distention and abdominal pain.   Endocrine: Negative for cold intolerance, heat intolerance and polyuria.   Genitourinary:  Negative for decreased urine volume, difficulty urinating, dyspareunia, dysuria, enuresis, flank pain, frequency, genital sores, hematuria, menstrual problem, pelvic pain, urgency, vaginal bleeding, vaginal discharge and vaginal pain.   Musculoskeletal:  Negative for back pain and neck pain.   Skin:  Negative for color change and rash.   Allergic/Immunologic: Negative for  immunocompromised state.   Neurological:  Negative for dizziness.   Hematological:  Negative for adenopathy.   Psychiatric/Behavioral:  Negative for agitation, behavioral problems and confusion.        Physical Exam:  Physical Exam  Constitutional:       Appearance: She is well-developed.   HENT:      Head: Normocephalic and atraumatic.      Right Ear: External ear normal.      Left Ear: External ear normal.   Eyes:      Conjunctiva/sclera: Conjunctivae normal.   Neck:      Vascular: No JVD.   Cardiovascular:      Rate and Rhythm: Normal rate and regular rhythm.   Pulmonary:      Effort: Pulmonary effort is normal. No respiratory distress.      Breath sounds: Normal breath sounds. No wheezing.   Abdominal:      General: There is no distension.      Palpations: Abdomen is soft.      Tenderness: There is no abdominal tenderness. There is no rebound.   Musculoskeletal:         General: Normal range of motion.      Cervical back: Normal range of motion.   Skin:     General: Skin is warm and dry.      Findings: No erythema or rash.   Neurological:      Mental Status: She is alert and oriented to person, place, and time.   Psychiatric:         Behavior: Behavior normal.         Assessment/Plan:     Urinary urge incontinence: patient has failed ditropan in the past and would like to discuss different options. She admits to drinking a large volume of liquids. Encouraged the patient to decrease fluids. Patient will contact her insurance to determine pricing of [employer].  If she would like to proceed we can send full prescription for ureter pharmacy.  Follow up in 1 year.  PVR 0 mL  Problem List Items Addressed This Visit    None  Visit Diagnoses       Urge urinary incontinence    -  Primary    Relevant Orders    POCT Bladder Scan                    HPI